# Patient Record
Sex: MALE | Race: BLACK OR AFRICAN AMERICAN
[De-identification: names, ages, dates, MRNs, and addresses within clinical notes are randomized per-mention and may not be internally consistent; named-entity substitution may affect disease eponyms.]

---

## 2020-07-14 ENCOUNTER — HOSPITAL ENCOUNTER (EMERGENCY)
Dept: HOSPITAL 92 - ERS | Age: 30
End: 2020-07-14
Payer: SELF-PAY

## 2020-07-14 DIAGNOSIS — S21.132A: ICD-10-CM

## 2020-07-14 DIAGNOSIS — I46.9: Primary | ICD-10-CM
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
## 2020-07-14 PROCEDURE — 71045 X-RAY EXAM CHEST 1 VIEW: CPT

## 2020-07-14 PROCEDURE — 36556 INSERT NON-TUNNEL CV CATH: CPT

## 2020-07-14 PROCEDURE — 96374 THER/PROPH/DIAG INJ IV PUSH: CPT

## 2020-07-14 PROCEDURE — 92950 HEART/LUNG RESUSCITATION CPR: CPT

## 2020-07-14 PROCEDURE — 32551 INSERTION OF CHEST TUBE: CPT
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
